# Patient Record
Sex: FEMALE | Race: WHITE | NOT HISPANIC OR LATINO | Employment: FULL TIME | URBAN - METROPOLITAN AREA
[De-identification: names, ages, dates, MRNs, and addresses within clinical notes are randomized per-mention and may not be internally consistent; named-entity substitution may affect disease eponyms.]

---

## 2023-03-30 LAB
HEPATITIS B VIRUS SURFACE AG PRESENCE IN SERUM: NONREACTIVE
HEPATITIS C VIRUS AB PRESENCE IN SERUM: NONREACTIVE
HIV 1/ 2 AG/AB SCREEN: NONREACTIVE
SYPHILIS TOTAL AB: NONREACTIVE

## 2023-03-31 LAB
CHLAMYDIA TRACH., AMPLIFIED: NEGATIVE
N. GONORRHEA, AMPLIFIED: NEGATIVE
TRICHOMONAS VAGINALIS: NEGATIVE

## 2023-05-04 LAB
ALANINE AMINOTRANSFERASE (SGPT) (U/L) IN SER/PLAS: 10 U/L (ref 7–45)
ALBUMIN (G/DL) IN SER/PLAS: 4.5 G/DL (ref 3.4–5)
ALKALINE PHOSPHATASE (U/L) IN SER/PLAS: 52 U/L (ref 33–110)
ANION GAP IN SER/PLAS: 12 MMOL/L (ref 10–20)
APPEARANCE, URINE: CLEAR
ASPARTATE AMINOTRANSFERASE (SGOT) (U/L) IN SER/PLAS: 14 U/L (ref 9–39)
BILIRUBIN TOTAL (MG/DL) IN SER/PLAS: 0.9 MG/DL (ref 0–1.2)
BILIRUBIN, URINE: NEGATIVE
BLOOD, URINE: NEGATIVE
CALCIUM (MG/DL) IN SER/PLAS: 8.5 MG/DL (ref 8.6–10.3)
CARBON DIOXIDE, TOTAL (MMOL/L) IN SER/PLAS: 28 MMOL/L (ref 21–32)
CHLORIDE (MMOL/L) IN SER/PLAS: 101 MMOL/L (ref 98–107)
CHOLESTEROL (MG/DL) IN SER/PLAS: 117 MG/DL (ref 0–199)
CHOLESTEROL IN HDL (MG/DL) IN SER/PLAS: 48.8 MG/DL
CHOLESTEROL/HDL RATIO: 2.4
COLOR, URINE: YELLOW
CREATININE (MG/DL) IN SER/PLAS: 0.88 MG/DL (ref 0.5–1.05)
ERYTHROCYTE DISTRIBUTION WIDTH (RATIO) BY AUTOMATED COUNT: 11.1 % (ref 11.5–14.5)
ERYTHROCYTE MEAN CORPUSCULAR HEMOGLOBIN CONCENTRATION (G/DL) BY AUTOMATED: 33.6 G/DL (ref 32–36)
ERYTHROCYTE MEAN CORPUSCULAR VOLUME (FL) BY AUTOMATED COUNT: 97 FL (ref 80–100)
ERYTHROCYTES (10*6/UL) IN BLOOD BY AUTOMATED COUNT: 4.58 X10E12/L (ref 4–5.2)
GFR FEMALE: >90 ML/MIN/1.73M2
GLUCOSE (MG/DL) IN SER/PLAS: 77 MG/DL (ref 74–99)
GLUCOSE, URINE: NEGATIVE MG/DL
HEMATOCRIT (%) IN BLOOD BY AUTOMATED COUNT: 44.4 % (ref 36–46)
HEMOGLOBIN (G/DL) IN BLOOD: 14.9 G/DL (ref 12–16)
KETONES, URINE: NEGATIVE MG/DL
LDL: 56 MG/DL (ref 0–99)
LEUKOCYTE ESTERASE, URINE: NEGATIVE
LEUKOCYTES (10*3/UL) IN BLOOD BY AUTOMATED COUNT: 6.4 X10E9/L (ref 4.4–11.3)
MUCUS, URINE: NORMAL /LPF
NITRITE, URINE: NEGATIVE
PH, URINE: 6 (ref 5–8)
PLATELETS (10*3/UL) IN BLOOD AUTOMATED COUNT: 201 X10E9/L (ref 150–450)
POTASSIUM (MMOL/L) IN SER/PLAS: 3.6 MMOL/L (ref 3.5–5.3)
PROTEIN TOTAL: 6.3 G/DL (ref 6.4–8.2)
PROTEIN, URINE: NEGATIVE MG/DL
RBC, URINE: <1 /HPF (ref 0–5)
SODIUM (MMOL/L) IN SER/PLAS: 137 MMOL/L (ref 136–145)
SPECIFIC GRAVITY, URINE: 1.01 (ref 1–1.03)
SQUAMOUS EPITHELIAL CELLS, URINE: 2 /HPF
TRIGLYCERIDE (MG/DL) IN SER/PLAS: 60 MG/DL (ref 0–149)
UREA NITROGEN (MG/DL) IN SER/PLAS: 11 MG/DL (ref 6–23)
UROBILINOGEN, URINE: <2 MG/DL (ref 0–1.9)
VLDL: 12 MG/DL (ref 0–40)
WBC, URINE: <1 /HPF (ref 0–5)

## 2023-05-05 LAB — URINE CULTURE: NORMAL

## 2023-05-06 LAB
CLUE CELLS: PRESENT
NUGENT SCORE: 7
YEAST: PRESENT

## 2023-07-25 ENCOUNTER — HOSPITAL ENCOUNTER (OUTPATIENT)
Dept: DATA CONVERSION | Facility: HOSPITAL | Age: 28
End: 2023-07-25
Attending: OBSTETRICS & GYNECOLOGY | Admitting: OBSTETRICS & GYNECOLOGY
Payer: COMMERCIAL

## 2023-07-25 DIAGNOSIS — Z30.2 ENCOUNTER FOR STERILIZATION: ICD-10-CM

## 2023-07-25 LAB — HCG, URINE: NEGATIVE

## 2023-08-03 LAB
COMPLETE PATHOLOGY REPORT: NORMAL
CONVERTED CLINICAL DIAGNOSIS-HISTORY: NORMAL
CONVERTED FINAL DIAGNOSIS: NORMAL
CONVERTED FINAL REPORT PDF LINK TO COPY AND PASTE: NORMAL
CONVERTED GROSS DESCRIPTION: NORMAL

## 2023-09-29 VITALS — WEIGHT: 182.98 LBS | BODY MASS INDEX: 27.73 KG/M2 | HEIGHT: 68 IN

## 2023-10-02 NOTE — OP NOTE
PROCEDURE DETAILS    Preoperative Diagnosis:  Desires Sterility  Postoperative Diagnosis:  Desires Sterility  Surgeon: Augustine Stinson  Resident/Fellow/Other Assistant: Jairon Daugherty    Procedure:  1. BILATERAL SALPINGECTOMY, Laparoscopic    Anesthesia: Vinh Kramer  Estimated Blood Loss: <5  Findings: normal appearing uterus, tubes, ovaries  Specimens(s) Collected: yes,  Uterine tubes         Operative Report:   The pt. was taken to the OR where general anesthesia was induced.  She was then prepped and draped in the usual fashion for both an abdominal and vaginal procedure.   A blanco catheter was placed.  A bivalve speculum was placed in the vagina exposing the cervix.  A uterine manipulator was then inserted into the uterus and secured to the cervix.      The surgeon changed her gloves and attention was then focused on the patients abdomen.  A 5 mm incision was created in the umbilicus and a 5 mm  trochar was advanced directly after elevating the abdominal wall.  Adequate placement was verified using the laparoscope.  The abdomen was then insufflated with adequate CO2 insufflation.  A second incision site was then created in the left lower quadrant  measuring 8mm.  A 8mm trochar was advanced under direct visualization.  A third incision site was created in the right lower quadrant measuring 5 mm and a 5 mm trochar was inserted under direct visualization.  The right uterine tube was identified and  followed out through the fimbriated end.  The fimbriated end was grasped and the ligasure was used to clamp/cauterize/cut the mesosalpinx along the length of the tube to the level of the cornu.  At this level the tube was severed and freed and removed  through the trocar.  The cauterized site was inspected and a small area was cauterized to achieve good hemostasis.  The left uterine tube was identified and grasped at the fimbriated end and the ligasure was used to clamp/cauterize/cut the mesosalpinx   along the length of the tube to the level of the cornu.  At this level the tube was severed and freed and removed through the trocar.  The cauterized site was inspected and good hemostasis was noted. The pelvis was inspected and appeared normal.  All  instruments were then removed, and the CO2 gas was allowed to escape the abdomen.    The incision sites were closed using 4-O vicryl.  This completed the procedure.  All sponge, needle and instrument counts were correct.  The patient was awakened, extubated  and transfered to recovery room in stable condition.  Note Recipients:   Augustine Stinson MD - [phone] [Preferred]  Juvenal Moya MD - [phone] []                        Attestation:   Note Completion:  Attending Attestation I performed the procedure without a resident         Electronic Signatures:  Augustine Stinson)  (Signed 25-Jul-2023 17:29)   Authored: Post-Operative Note, Chart Review, Note Completion      Last Updated: 25-Jul-2023 17:29 by Augustine Stinson)

## 2023-10-03 ENCOUNTER — HOSPITAL ENCOUNTER (EMERGENCY)
Facility: HOSPITAL | Age: 28
Discharge: OTHER INSTITUTION | End: 2023-10-03
Attending: STUDENT IN AN ORGANIZED HEALTH CARE EDUCATION/TRAINING PROGRAM | Admitting: STUDENT IN AN ORGANIZED HEALTH CARE EDUCATION/TRAINING PROGRAM
Payer: COMMERCIAL

## 2023-10-03 VITALS
HEIGHT: 68 IN | TEMPERATURE: 98.2 F | WEIGHT: 185 LBS | BODY MASS INDEX: 28.04 KG/M2 | HEART RATE: 88 BPM | OXYGEN SATURATION: 98 % | RESPIRATION RATE: 18 BRPM | SYSTOLIC BLOOD PRESSURE: 121 MMHG | DIASTOLIC BLOOD PRESSURE: 81 MMHG

## 2023-10-03 PROCEDURE — 4500999001 HC ED NO CHARGE

## 2023-10-03 ASSESSMENT — PAIN DESCRIPTION - LOCATION: LOCATION: HEAD

## 2023-10-03 ASSESSMENT — COLUMBIA-SUICIDE SEVERITY RATING SCALE - C-SSRS
6. HAVE YOU EVER DONE ANYTHING, STARTED TO DO ANYTHING, OR PREPARED TO DO ANYTHING TO END YOUR LIFE?: NO
1. IN THE PAST MONTH, HAVE YOU WISHED YOU WERE DEAD OR WISHED YOU COULD GO TO SLEEP AND NOT WAKE UP?: NO
2. HAVE YOU ACTUALLY HAD ANY THOUGHTS OF KILLING YOURSELF?: NO

## 2023-10-03 ASSESSMENT — PAIN SCALES - GENERAL: PAINLEVEL_OUTOF10: 6

## 2023-10-03 ASSESSMENT — PAIN - FUNCTIONAL ASSESSMENT
PAIN_FUNCTIONAL_ASSESSMENT: 0-10
PAIN_FUNCTIONAL_ASSESSMENT: 0-10

## 2023-10-04 ENCOUNTER — TELEPHONE (OUTPATIENT)
Dept: PRIMARY CARE | Facility: CLINIC | Age: 28
End: 2023-10-04
Payer: COMMERCIAL

## 2023-10-04 NOTE — TELEPHONE ENCOUNTER
Carrie called as she believes she has a concussion. She was hit in the head twice with a soccer ball. She is asking she can be seen as she was in the ER for 5 hours and nothing was done. She is asking for x-ray and CT. She has HA's, blurred vision, nausea and dizziness.

## 2023-10-07 PROBLEM — M79.671 BILATERAL FOOT PAIN: Status: ACTIVE | Noted: 2023-06-01

## 2023-10-07 PROBLEM — B02.9 SHINGLES: Status: ACTIVE | Noted: 2023-10-07

## 2023-10-07 PROBLEM — M72.2 PLANTAR FASCIITIS: Status: ACTIVE | Noted: 2023-06-01

## 2023-10-07 PROBLEM — G57.63 MORTON'S NEUROMA OF BOTH FEET: Status: ACTIVE | Noted: 2023-06-01

## 2023-10-07 PROBLEM — M79.672 BILATERAL FOOT PAIN: Status: ACTIVE | Noted: 2023-06-01

## 2023-10-07 RX ORDER — IBUPROFEN 800 MG/1
800 TABLET ORAL EVERY 6 HOURS PRN
COMMUNITY

## 2023-10-07 RX ORDER — TOBRAMYCIN AND DEXAMETHASONE 3; 1 MG/ML; MG/ML
2 SUSPENSION/ DROPS OPHTHALMIC EVERY 4 HOURS
COMMUNITY
Start: 2020-12-04

## 2023-10-07 RX ORDER — NORETHINDRONE 0.35 MG/1
1 TABLET ORAL DAILY
COMMUNITY

## 2023-10-09 ENCOUNTER — OFFICE VISIT (OUTPATIENT)
Dept: PRIMARY CARE | Facility: CLINIC | Age: 28
End: 2023-10-09
Payer: COMMERCIAL

## 2023-10-09 VITALS
BODY MASS INDEX: 28.7 KG/M2 | SYSTOLIC BLOOD PRESSURE: 118 MMHG | DIASTOLIC BLOOD PRESSURE: 68 MMHG | TEMPERATURE: 98.4 F | WEIGHT: 189.4 LBS | HEART RATE: 65 BPM | HEIGHT: 68 IN | OXYGEN SATURATION: 98 %

## 2023-10-09 DIAGNOSIS — S06.0X0A CONCUSSION WITHOUT LOSS OF CONSCIOUSNESS, INITIAL ENCOUNTER: Primary | ICD-10-CM

## 2023-10-09 PROCEDURE — 1036F TOBACCO NON-USER: CPT | Performed by: FAMILY MEDICINE

## 2023-10-09 PROCEDURE — 99213 OFFICE O/P EST LOW 20 MIN: CPT | Performed by: FAMILY MEDICINE

## 2023-10-09 ASSESSMENT — ENCOUNTER SYMPTOMS
DISORIENTATION: 0
NUMBNESS: 0
DEPRESSION: 0
MEMORY LOSS: 0
VOMITING: 0
HEADACHES: 1
WEAKNESS: 0
BLURRED VISION: 0
LOSS OF SENSATION IN FEET: 0
OCCASIONAL FEELINGS OF UNSTEADINESS: 0

## 2023-10-09 ASSESSMENT — LIFESTYLE VARIABLES
HOW MANY STANDARD DRINKS CONTAINING ALCOHOL DO YOU HAVE ON A TYPICAL DAY: 1 OR 2
HOW OFTEN DO YOU HAVE A DRINK CONTAINING ALCOHOL: 2-4 TIMES A MONTH

## 2023-10-09 ASSESSMENT — PATIENT HEALTH QUESTIONNAIRE - PHQ9
SUM OF ALL RESPONSES TO PHQ9 QUESTIONS 1 AND 2: 0
2. FEELING DOWN, DEPRESSED OR HOPELESS: NOT AT ALL
1. LITTLE INTEREST OR PLEASURE IN DOING THINGS: NOT AT ALL

## 2023-10-09 ASSESSMENT — PAIN SCALES - GENERAL: PAINLEVEL: 5

## 2023-10-09 NOTE — PROGRESS NOTES
"Subjective   Patient ID: Carrie Allan is a 28 y.o. female who presents for Concussion.    Hit in head with soccer ball 2 x    Concussion   The incident occurred more than 1 week ago. The injury mechanism was a direct blow. There was no loss of consciousness. The quality of the pain is described as aching. The pain is mild. The pain has been constant since the injury. Associated symptoms include headaches. Pertinent negatives include no blurred vision (sensative to light), disorientation, memory loss, numbness, tinnitus, vomiting or weakness. She has tried nothing for the symptoms. The treatment provided no relief.        Review of Systems   HENT:  Negative for tinnitus.    Eyes:  Negative for blurred vision (sensative to light).   Gastrointestinal:  Negative for vomiting.   Neurological:  Positive for headaches. Negative for weakness and numbness.   Psychiatric/Behavioral:  Negative for memory loss.        Objective   /68 (BP Location: Left arm, Patient Position: Sitting, BP Cuff Size: Adult)   Pulse 65   Temp 36.9 °C (98.4 °F) (Temporal)   Ht 1.727 m (5' 8\")   Wt 85.9 kg (189 lb 6.4 oz)   LMP 09/11/2023   SpO2 98%   BMI 28.80 kg/m²     Physical Exam  Vitals reviewed.   Constitutional:       Appearance: Normal appearance.   HENT:      Head: Normocephalic and atraumatic.      Jaw: There is normal jaw occlusion.      Right Ear: Tympanic membrane and ear canal normal.      Left Ear: Tympanic membrane and ear canal normal.      Mouth/Throat:      Mouth: Mucous membranes are moist.      Pharynx: Oropharynx is clear. Uvula midline.   Cardiovascular:      Rate and Rhythm: Normal rate and regular rhythm.      Heart sounds: Normal heart sounds. No murmur heard.  Pulmonary:      Breath sounds: Normal breath sounds.   Abdominal:      General: Abdomen is flat. Bowel sounds are normal.      Palpations: Abdomen is soft.   Musculoskeletal:         General: No swelling.   Neurological:      Mental Status: She is " alert and oriented to person, place, and time.      Cranial Nerves: Cranial nerves 2-12 are intact.      Sensory: Sensation is intact.      Motor: Motor function is intact.      Coordination: Coordination is intact. Romberg sign negative. Coordination normal. Finger-Nose-Finger Test and Heel to Shin Test normal. Rapid alternating movements normal.      Gait: Gait is intact. Gait and tandem walk normal.      Deep Tendon Reflexes: Reflexes are normal and symmetric.         Assessment/Plan   Diagnoses and all orders for this visit:  Concussion without loss of consciousness, initial encounter

## 2024-08-30 ENCOUNTER — LAB REQUISITION (OUTPATIENT)
Dept: LAB | Facility: HOSPITAL | Age: 29
End: 2024-08-30
Payer: COMMERCIAL

## 2024-08-30 DIAGNOSIS — N94.9 UNSPECIFIED CONDITION ASSOCIATED WITH FEMALE GENITAL ORGANS AND MENSTRUAL CYCLE: ICD-10-CM

## 2024-08-31 LAB
C TRACH RRNA SPEC QL NAA+PROBE: NEGATIVE
CLUE CELLS VAG LPF-#/AREA: ABNORMAL /[LPF]
N GONORRHOEA DNA SPEC QL PROBE+SIG AMP: NEGATIVE
NUGENT SCORE: 0
YEAST VAG WET PREP-#/AREA: PRESENT

## 2024-09-01 LAB — BACTERIA UR CULT: ABNORMAL

## 2024-09-02 LAB — BACTERIA UR CULT: ABNORMAL

## 2025-04-25 ENCOUNTER — APPOINTMENT (OUTPATIENT)
Facility: CLINIC | Age: 30
End: 2025-04-25
Payer: COMMERCIAL

## 2025-04-25 VITALS
WEIGHT: 213 LBS | SYSTOLIC BLOOD PRESSURE: 114 MMHG | DIASTOLIC BLOOD PRESSURE: 72 MMHG | BODY MASS INDEX: 31.55 KG/M2 | HEIGHT: 69 IN

## 2025-04-25 DIAGNOSIS — R63.5 WEIGHT GAIN: Primary | ICD-10-CM

## 2025-04-25 PROCEDURE — 3008F BODY MASS INDEX DOCD: CPT | Performed by: ADVANCED PRACTICE MIDWIFE

## 2025-04-25 PROCEDURE — 99214 OFFICE O/P EST MOD 30 MIN: CPT | Performed by: ADVANCED PRACTICE MIDWIFE

## 2025-04-25 ASSESSMENT — ENCOUNTER SYMPTOMS
DEPRESSION: 0
OCCASIONAL FEELINGS OF UNSTEADINESS: 0
NAUSEA: 0
FATIGUE: 0
LOSS OF SENSATION IN FEET: 0
COUGH: 0
ABDOMINAL PAIN: 0
SHORTNESS OF BREATH: 0
VOMITING: 0
PALPITATIONS: 0
CONSTIPATION: 0
DIZZINESS: 0
DIARRHEA: 0
FEVER: 0
LIGHT-HEADEDNESS: 0

## 2025-04-25 ASSESSMENT — PATIENT HEALTH QUESTIONNAIRE - PHQ9
10. IF YOU CHECKED OFF ANY PROBLEMS, HOW DIFFICULT HAVE THESE PROBLEMS MADE IT FOR YOU TO DO YOUR WORK, TAKE CARE OF THINGS AT HOME, OR GET ALONG WITH OTHER PEOPLE: SOMEWHAT DIFFICULT
1. LITTLE INTEREST OR PLEASURE IN DOING THINGS: SEVERAL DAYS
2. FEELING DOWN, DEPRESSED OR HOPELESS: NOT AT ALL
SUM OF ALL RESPONSES TO PHQ9 QUESTIONS 1 AND 2: 1

## 2025-04-25 ASSESSMENT — COLUMBIA-SUICIDE SEVERITY RATING SCALE - C-SSRS
2. HAVE YOU ACTUALLY HAD ANY THOUGHTS OF KILLING YOURSELF?: NO
1. IN THE PAST MONTH, HAVE YOU WISHED YOU WERE DEAD OR WISHED YOU COULD GO TO SLEEP AND NOT WAKE UP?: NO
6. HAVE YOU EVER DONE ANYTHING, STARTED TO DO ANYTHING, OR PREPARED TO DO ANYTHING TO END YOUR LIFE?: NO

## 2025-04-25 ASSESSMENT — SOCIAL DETERMINANTS OF HEALTH (SDOH)

## 2025-04-25 ASSESSMENT — PAIN SCALES - GENERAL: PAINLEVEL_OUTOF10: 1

## 2025-04-25 NOTE — PROGRESS NOTES
Subjective   Patient ID: Carrie Allan is a 30 y.o. female who presents for hormonal (Salpingectomy 05/2023 /After about 6 months hormonal changes affecting daily living. /Moods, weight, depression).  HPI    29 y/o presents to office with c/o mood changes started about 1.5 years ago and worsening over the last 3-4 days. Has difficulty regulating her emotions and is easily triggered, but also has moments of apathy. C/o centralized weight gain and facial hair growth as well. Periods are usually monthly and regular and last 3-4 days.     Review of Systems   Constitutional:  Negative for fatigue and fever.   Respiratory:  Negative for cough and shortness of breath.    Cardiovascular:  Negative for chest pain and palpitations.   Gastrointestinal:  Negative for abdominal pain, constipation, diarrhea, nausea and vomiting.   Endocrine: Negative for cold intolerance and heat intolerance.   Genitourinary:  Negative for dyspareunia, pelvic pain, vaginal discharge and vaginal pain.   Neurological:  Negative for dizziness and light-headedness.       Objective   Physical Exam  Vitals and nursing note reviewed.   Cardiovascular:      Rate and Rhythm: Normal rate and regular rhythm.   Pulmonary:      Effort: Pulmonary effort is normal.      Breath sounds: Normal breath sounds.   Abdominal:      General: Abdomen is flat.      Palpations: Abdomen is soft.   Musculoskeletal:         General: Normal range of motion.   Neurological:      Mental Status: She is alert and oriented to person, place, and time. Mental status is at baseline.   Psychiatric:         Mood and Affect: Mood normal.         Assessment/Plan   Diagnoses and all orders for this visit:  Weight gain  -     TSH with reflex to Free T4 if abnormal; Future  -     Hemoglobin A1C; Future  -     Prolactin; Future  -     Testosterone,Free and Total; Future  -     CBC; Future  -     Comprehensive Metabolic Panel; Future           KARTIK Bradley 05/02/25 4:17 PM

## 2025-04-26 LAB
ALBUMIN SERPL-MCNC: 4.5 G/DL (ref 3.6–5.1)
ALP SERPL-CCNC: 61 U/L (ref 31–125)
ALT SERPL-CCNC: 19 U/L (ref 6–29)
ANION GAP SERPL CALCULATED.4IONS-SCNC: 7 MMOL/L (CALC) (ref 7–17)
AST SERPL-CCNC: 17 U/L (ref 10–30)
BILIRUB SERPL-MCNC: 0.5 MG/DL (ref 0.2–1.2)
BUN SERPL-MCNC: 11 MG/DL (ref 7–25)
CALCIUM SERPL-MCNC: 8.8 MG/DL (ref 8.6–10.2)
CHLORIDE SERPL-SCNC: 105 MMOL/L (ref 98–110)
CO2 SERPL-SCNC: 28 MMOL/L (ref 20–32)
CREAT SERPL-MCNC: 0.75 MG/DL (ref 0.5–0.97)
EGFRCR SERPLBLD CKD-EPI 2021: 110 ML/MIN/1.73M2
ERYTHROCYTE [DISTWIDTH] IN BLOOD BY AUTOMATED COUNT: 11.4 % (ref 11–15)
EST. AVERAGE GLUCOSE BLD GHB EST-MCNC: 94 MG/DL
EST. AVERAGE GLUCOSE BLD GHB EST-SCNC: 5.2 MMOL/L
GLUCOSE SERPL-MCNC: 80 MG/DL (ref 65–139)
HBA1C MFR BLD: 4.9 %
HCT VFR BLD AUTO: 40.1 % (ref 35–45)
HGB BLD-MCNC: 13.9 G/DL (ref 11.7–15.5)
MCH RBC QN AUTO: 33.4 PG (ref 27–33)
MCHC RBC AUTO-ENTMCNC: 34.7 G/DL (ref 32–36)
MCV RBC AUTO: 96.4 FL (ref 80–100)
PLATELET # BLD AUTO: 239 THOUSAND/UL (ref 140–400)
PMV BLD REES-ECKER: 9.9 FL (ref 7.5–12.5)
POTASSIUM SERPL-SCNC: 3.6 MMOL/L (ref 3.5–5.3)
PROLACTIN SERPL-MCNC: 5.4 NG/ML
PROT SERPL-MCNC: 6.7 G/DL (ref 6.1–8.1)
RBC # BLD AUTO: 4.16 MILLION/UL (ref 3.8–5.1)
SODIUM SERPL-SCNC: 140 MMOL/L (ref 135–146)
TESTOST FREE SERPL-MCNC: NORMAL PG/ML
TESTOST SERPL-MCNC: NORMAL NG/DL
TSH SERPL-ACNC: 1.65 MIU/L
WBC # BLD AUTO: 5.9 THOUSAND/UL (ref 3.8–10.8)

## 2025-04-29 LAB
ALBUMIN SERPL-MCNC: 4.5 G/DL (ref 3.6–5.1)
ALP SERPL-CCNC: 61 U/L (ref 31–125)
ALT SERPL-CCNC: 19 U/L (ref 6–29)
ANION GAP SERPL CALCULATED.4IONS-SCNC: 7 MMOL/L (CALC) (ref 7–17)
AST SERPL-CCNC: 17 U/L (ref 10–30)
BILIRUB SERPL-MCNC: 0.5 MG/DL (ref 0.2–1.2)
BUN SERPL-MCNC: 11 MG/DL (ref 7–25)
CALCIUM SERPL-MCNC: 8.8 MG/DL (ref 8.6–10.2)
CHLORIDE SERPL-SCNC: 105 MMOL/L (ref 98–110)
CO2 SERPL-SCNC: 28 MMOL/L (ref 20–32)
CREAT SERPL-MCNC: 0.75 MG/DL (ref 0.5–0.97)
EGFRCR SERPLBLD CKD-EPI 2021: 110 ML/MIN/1.73M2
ERYTHROCYTE [DISTWIDTH] IN BLOOD BY AUTOMATED COUNT: 11.4 % (ref 11–15)
EST. AVERAGE GLUCOSE BLD GHB EST-MCNC: 94 MG/DL
EST. AVERAGE GLUCOSE BLD GHB EST-SCNC: 5.2 MMOL/L
GLUCOSE SERPL-MCNC: 80 MG/DL (ref 65–139)
HBA1C MFR BLD: 4.9 %
HCT VFR BLD AUTO: 40.1 % (ref 35–45)
HGB BLD-MCNC: 13.9 G/DL (ref 11.7–15.5)
MCH RBC QN AUTO: 33.4 PG (ref 27–33)
MCHC RBC AUTO-ENTMCNC: 34.7 G/DL (ref 32–36)
MCV RBC AUTO: 96.4 FL (ref 80–100)
PLATELET # BLD AUTO: 239 THOUSAND/UL (ref 140–400)
PMV BLD REES-ECKER: 9.9 FL (ref 7.5–12.5)
POTASSIUM SERPL-SCNC: 3.6 MMOL/L (ref 3.5–5.3)
PROLACTIN SERPL-MCNC: 5.4 NG/ML
PROT SERPL-MCNC: 6.7 G/DL (ref 6.1–8.1)
RBC # BLD AUTO: 4.16 MILLION/UL (ref 3.8–5.1)
SODIUM SERPL-SCNC: 140 MMOL/L (ref 135–146)
TESTOST FREE SERPL-MCNC: 3.2 PG/ML (ref 0.1–6.4)
TESTOST SERPL-MCNC: 31 NG/DL (ref 2–45)
TSH SERPL-ACNC: 1.65 MIU/L
WBC # BLD AUTO: 5.9 THOUSAND/UL (ref 3.8–10.8)

## 2025-05-22 ENCOUNTER — TELEPHONE (OUTPATIENT)
Facility: CLINIC | Age: 30
End: 2025-05-22
Payer: COMMERCIAL

## 2025-08-17 ENCOUNTER — OFFICE VISIT (OUTPATIENT)
Dept: URGENT CARE | Age: 30
End: 2025-08-17
Payer: COMMERCIAL

## 2025-08-17 VITALS
SYSTOLIC BLOOD PRESSURE: 112 MMHG | TEMPERATURE: 98.1 F | WEIGHT: 200 LBS | BODY MASS INDEX: 29.62 KG/M2 | OXYGEN SATURATION: 99 % | DIASTOLIC BLOOD PRESSURE: 65 MMHG | HEART RATE: 60 BPM | RESPIRATION RATE: 16 BRPM | HEIGHT: 69 IN

## 2025-08-17 DIAGNOSIS — J06.9 VIRAL URI: ICD-10-CM

## 2025-08-17 DIAGNOSIS — J00 ACUTE RHINITIS: Primary | ICD-10-CM

## 2025-08-17 PROCEDURE — 99213 OFFICE O/P EST LOW 20 MIN: CPT | Performed by: PHYSICIAN ASSISTANT

## 2025-08-17 PROCEDURE — 3008F BODY MASS INDEX DOCD: CPT | Performed by: PHYSICIAN ASSISTANT

## 2025-08-17 PROCEDURE — 1036F TOBACCO NON-USER: CPT | Performed by: PHYSICIAN ASSISTANT

## 2025-08-17 RX ORDER — AZITHROMYCIN 250 MG/1
250 TABLET, FILM COATED ORAL DAILY
Qty: 6 TABLET | Refills: 0 | Status: SHIPPED | OUTPATIENT
Start: 2025-08-17 | End: 2025-08-22

## 2025-08-17 RX ORDER — FLUTICASONE PROPIONATE 50 MCG
1 SPRAY, SUSPENSION (ML) NASAL 2 TIMES DAILY
Qty: 16 G | Refills: 0 | Status: SHIPPED | OUTPATIENT
Start: 2025-08-17 | End: 2025-08-27

## 2025-08-17 ASSESSMENT — ENCOUNTER SYMPTOMS
GASTROINTESTINAL NEGATIVE: 1
COUGH: 1
RHINORRHEA: 1
CARDIOVASCULAR NEGATIVE: 1
CONSTITUTIONAL NEGATIVE: 1
SINUS PRESSURE: 1
FACIAL SWELLING: 0
SHORTNESS OF BREATH: 0